# Patient Record
Sex: MALE | Race: WHITE | NOT HISPANIC OR LATINO | Employment: FULL TIME | URBAN - METROPOLITAN AREA
[De-identification: names, ages, dates, MRNs, and addresses within clinical notes are randomized per-mention and may not be internally consistent; named-entity substitution may affect disease eponyms.]

---

## 2020-11-03 ENCOUNTER — HOSPITAL ENCOUNTER (OUTPATIENT)
Dept: LAB | Facility: MEDICAL CENTER | Age: 35
End: 2020-11-03
Attending: PATHOLOGY
Payer: COMMERCIAL

## 2020-11-04 LAB — COVID ORDER STATUS COVID19: NORMAL

## 2020-11-05 LAB
SARS-COV-2 RNA RESP QL NAA+PROBE: NOTDETECTED
SPECIMEN SOURCE: NORMAL

## 2020-11-10 ENCOUNTER — HOSPITAL ENCOUNTER (OUTPATIENT)
Dept: LAB | Facility: MEDICAL CENTER | Age: 35
End: 2020-11-10
Payer: COMMERCIAL

## 2020-11-10 LAB — COVID ORDER STATUS COVID19: NORMAL

## 2020-11-11 LAB
SARS-COV-2 RNA RESP QL NAA+PROBE: NOTDETECTED
SPECIMEN SOURCE: NORMAL

## 2023-05-13 ENCOUNTER — OFFICE VISIT (OUTPATIENT)
Dept: URGENT CARE | Facility: PHYSICIAN GROUP | Age: 38
End: 2023-05-13
Payer: COMMERCIAL

## 2023-05-13 VITALS
DIASTOLIC BLOOD PRESSURE: 64 MMHG | SYSTOLIC BLOOD PRESSURE: 100 MMHG | HEIGHT: 70 IN | HEART RATE: 84 BPM | BODY MASS INDEX: 22.9 KG/M2 | RESPIRATION RATE: 12 BRPM | TEMPERATURE: 98.1 F | OXYGEN SATURATION: 98 % | WEIGHT: 160 LBS

## 2023-05-13 DIAGNOSIS — J02.0 STREP PHARYNGITIS: ICD-10-CM

## 2023-05-13 DIAGNOSIS — J02.9 SORE THROAT: ICD-10-CM

## 2023-05-13 LAB
INT CON NEG: NORMAL
INT CON POS: NORMAL
S PYO AG THROAT QL: POSITIVE

## 2023-05-13 PROCEDURE — 99203 OFFICE O/P NEW LOW 30 MIN: CPT | Performed by: PHYSICIAN ASSISTANT

## 2023-05-13 PROCEDURE — 87651 STREP A DNA AMP PROBE: CPT | Performed by: PHYSICIAN ASSISTANT

## 2023-05-13 RX ORDER — AMOXICILLIN 500 MG/1
500 CAPSULE ORAL 2 TIMES DAILY
Qty: 20 CAPSULE | Refills: 0 | Status: SHIPPED | OUTPATIENT
Start: 2023-05-13 | End: 2023-05-23

## 2023-05-13 ASSESSMENT — ENCOUNTER SYMPTOMS
VOMITING: 0
EYE REDNESS: 0
HEADACHES: 1
COUGH: 0
MYALGIAS: 1
EYE DISCHARGE: 0
DIARRHEA: 0
TROUBLE SWALLOWING: 1
FEVER: 0
NAUSEA: 0
SORE THROAT: 1
SHORTNESS OF BREATH: 0

## 2023-05-13 NOTE — PROGRESS NOTES
"Subjective     Keaton Bourne is a 38 y.o. male who presents with Illness (X3days sore throat, left ear pain and puss on throat )            Pharyngitis   This is a new problem. Episode onset: x 4-5 days ago. The problem has been unchanged. The pain is worse on the left side. There has been no fever. Associated symptoms include congestion, ear pain, headaches and trouble swallowing (The patient reports increased pain with swallowing.). Pertinent negatives include no coughing, diarrhea, drooling, shortness of breath or vomiting. He has had no exposure to strep. He has tried nothing for the symptoms.     The patient reports no recent sick contacts    PMH:  has no past medical history on file.  MEDS: No current outpatient medications on file.  ALLERGIES: No Known Allergies  SURGHX: No past surgical history on file.  SOCHX:  reports that he has never smoked. He does not have any smokeless tobacco history on file. He reports current alcohol use. He reports current drug use.  FH: Family history was reviewed, no pertinent findings to report    Review of Systems   Constitutional:  Negative for fever.   HENT:  Positive for congestion, ear pain, sore throat and trouble swallowing (The patient reports increased pain with swallowing.). Negative for drooling.    Eyes:  Negative for discharge and redness.   Respiratory:  Negative for cough and shortness of breath.    Cardiovascular:  Negative for chest pain.   Gastrointestinal:  Negative for diarrhea, nausea and vomiting.   Musculoskeletal:  Positive for myalgias.   Neurological:  Positive for headaches.              Objective     /64 (BP Location: Right arm, Patient Position: Sitting, BP Cuff Size: Adult)   Pulse 84   Temp 36.7 °C (98.1 °F) (Temporal)   Resp 12   Ht 1.778 m (5' 10\")   Wt 72.6 kg (160 lb)   SpO2 98%   BMI 22.96 kg/m²      Physical Exam  Constitutional:       General: He is not in acute distress.     Appearance: Normal appearance. He is not " ill-appearing.   HENT:      Head: Normocephalic and atraumatic.      Right Ear: External ear normal.      Left Ear: External ear normal.      Nose: Nose normal.      Mouth/Throat:      Mouth: Mucous membranes are moist.      Pharynx: Oropharynx is clear. Uvula midline. Posterior oropharyngeal erythema present.      Tonsils: Tonsillar exudate present. 1+ on the left.   Eyes:      Extraocular Movements: Extraocular movements intact.      Conjunctiva/sclera: Conjunctivae normal.   Cardiovascular:      Rate and Rhythm: Normal rate and regular rhythm.      Heart sounds: Normal heart sounds.   Pulmonary:      Effort: Pulmonary effort is normal. No respiratory distress.      Breath sounds: Normal breath sounds. No wheezing.   Musculoskeletal:         General: Normal range of motion.      Cervical back: Normal range of motion and neck supple.   Skin:     General: Skin is warm and dry.   Neurological:      Mental Status: He is alert and oriented to person, place, and time.               Progress:  POCT Rapid Strep: POSITIVE               Assessment & Plan          1. Sore throat  - POCT Rapid Strep A    2. Strep pharyngitis  - amoxicillin (AMOXIL) 500 MG Cap; Take 1 Capsule by mouth 2 times a day for 10 days.  Dispense: 20 Capsule; Refill: 0    Differential diagnoses, supportive care, and indications for immediate follow-up discussed with patient.   Instructed to return to clinic or nearest emergency department for any change in condition, further concerns, or worsening of symptoms.    OTC Tylenol or Motrin for fever/discomfort.  OTC Supportive Care for Sore Throat - warm salt water gargles, sore throat lozenges, warm lemon water, and/or tea.  Drink plenty of fluids  Follow-up with PCP   Return to clinic or go to the ED if symptoms worsen or fail to improve, or if patient should develop worsening/increasing sore throat, difficulty swallowing, drooling, change in voice, swollen glands, shortness of breath, ear pain, cough,  congestion, fever/chills, and/or any concerning symptoms.    Discussed plan with the patient, and he agrees to the above.    I personally reviewed prior external notes and test results pertinent to today's visit.  I have independently reviewed and interpreted all diagnostics ordered during this urgent care visit.     Please note that this dictation was created using voice recognition software. I have made every reasonable attempt to correct obvious errors, but I expect that there may be errors of grammar and possibly content that I did not discover before finalizing the note.     This note was electronically signed by Adelita Lim PA-C

## 2024-07-25 ENCOUNTER — HOSPITAL ENCOUNTER (EMERGENCY)
Facility: MEDICAL CENTER | Age: 39
End: 2024-07-25
Attending: EMERGENCY MEDICINE
Payer: COMMERCIAL

## 2024-07-25 ENCOUNTER — APPOINTMENT (OUTPATIENT)
Dept: URGENT CARE | Facility: PHYSICIAN GROUP | Age: 39
End: 2024-07-25
Payer: COMMERCIAL

## 2024-07-25 VITALS
HEART RATE: 68 BPM | OXYGEN SATURATION: 97 % | TEMPERATURE: 97.9 F | RESPIRATION RATE: 18 BRPM | SYSTOLIC BLOOD PRESSURE: 122 MMHG | DIASTOLIC BLOOD PRESSURE: 74 MMHG

## 2024-07-25 DIAGNOSIS — Z77.098 CHEMICAL EXPOSURE OF EYE: ICD-10-CM

## 2024-07-25 PROCEDURE — 304217 HCHG IRRIGATION SYSTEM: Mod: EDC

## 2024-07-25 PROCEDURE — 700101 HCHG RX REV CODE 250: Performed by: EMERGENCY MEDICINE

## 2024-07-25 PROCEDURE — 99283 EMERGENCY DEPT VISIT LOW MDM: CPT | Mod: EDC

## 2024-07-25 PROCEDURE — 700105 HCHG RX REV CODE 258: Performed by: EMERGENCY MEDICINE

## 2024-07-25 RX ORDER — SODIUM CHLORIDE 9 MG/ML
1000 INJECTION, SOLUTION INTRAVENOUS ONCE
Status: COMPLETED | OUTPATIENT
Start: 2024-07-25 | End: 2024-07-25

## 2024-07-25 RX ORDER — PROPARACAINE HYDROCHLORIDE 5 MG/ML
2 SOLUTION/ DROPS OPHTHALMIC ONCE
Status: COMPLETED | OUTPATIENT
Start: 2024-07-25 | End: 2024-07-25

## 2024-07-25 RX ADMIN — PROPARACAINE HYDROCHLORIDE 2 DROP: 5 SOLUTION/ DROPS OPHTHALMIC at 09:49

## 2024-07-25 RX ADMIN — SODIUM CHLORIDE 1000 ML: 9 INJECTION, SOLUTION INTRAVENOUS at 09:30

## 2024-07-25 RX ADMIN — FLUORESCEIN SODIUM 1 MG: 1 STRIP OPHTHALMIC at 09:49

## 2024-07-25 ASSESSMENT — PAIN SCALES - WONG BAKER: WONGBAKER_NUMERICALRESPONSE: DOESN'T HURT AT ALL

## 2024-07-29 ENCOUNTER — OCCUPATIONAL MEDICINE (OUTPATIENT)
Dept: OCCUPATIONAL MEDICINE | Facility: CLINIC | Age: 39
End: 2024-07-29
Payer: COMMERCIAL

## 2024-07-29 VITALS
TEMPERATURE: 98.4 F | WEIGHT: 172 LBS | DIASTOLIC BLOOD PRESSURE: 84 MMHG | SYSTOLIC BLOOD PRESSURE: 130 MMHG | HEIGHT: 71 IN | RESPIRATION RATE: 16 BRPM | HEART RATE: 89 BPM | OXYGEN SATURATION: 97 % | BODY MASS INDEX: 24.08 KG/M2

## 2024-07-29 DIAGNOSIS — Z77.098 CHEMICAL EXPOSURE OF EYE: ICD-10-CM

## 2024-07-29 DIAGNOSIS — H53.9 VISUAL CHANGES: ICD-10-CM

## 2024-08-06 ENCOUNTER — OCCUPATIONAL MEDICINE (OUTPATIENT)
Dept: OCCUPATIONAL MEDICINE | Facility: CLINIC | Age: 39
End: 2024-08-06
Payer: COMMERCIAL

## 2024-08-06 VITALS
SYSTOLIC BLOOD PRESSURE: 108 MMHG | HEIGHT: 71 IN | DIASTOLIC BLOOD PRESSURE: 72 MMHG | HEART RATE: 90 BPM | RESPIRATION RATE: 16 BRPM | BODY MASS INDEX: 24.92 KG/M2 | TEMPERATURE: 97.8 F | OXYGEN SATURATION: 97 % | WEIGHT: 178 LBS

## 2024-08-06 DIAGNOSIS — Z77.098 CHEMICAL EXPOSURE OF EYE: ICD-10-CM

## 2024-08-06 DIAGNOSIS — H53.9 VISUAL CHANGES: ICD-10-CM

## 2024-08-06 PROCEDURE — 3074F SYST BP LT 130 MM HG: CPT | Performed by: NURSE PRACTITIONER

## 2024-08-06 PROCEDURE — 99213 OFFICE O/P EST LOW 20 MIN: CPT | Performed by: NURSE PRACTITIONER

## 2024-08-06 PROCEDURE — 3078F DIAST BP <80 MM HG: CPT | Performed by: NURSE PRACTITIONER

## 2024-08-06 ASSESSMENT — ENCOUNTER SYMPTOMS
EYE PAIN: 0
EYE REDNESS: 1
PHOTOPHOBIA: 0
EYE DISCHARGE: 0
CARDIOVASCULAR NEGATIVE: 1
BLURRED VISION: 0
SENSORY CHANGE: 0
CONSTITUTIONAL NEGATIVE: 1
DOUBLE VISION: 0
PSYCHIATRIC NEGATIVE: 1

## 2024-08-06 NOTE — LETTER
PHYSICIAN’S AND CHIROPRACTIC PHYSICIAN'S   PROGRESS REPORT CERTIFICATION OF DISABILITY Claim Number:     Social Security Number:    Patient’s Name:Keaton Bourne Date of Injury:7/24/2024   Employer: [employer] Name of O (if applicable)      Patient’s Job Description/Occupation: [employer]       Previous Injuries/Diseases/Surgeries Contributing to the Condition:  Copied from ED visit ) DOI 6/24/2024.  RANDALL: Keaton Bourne is a 39 y.o. male who presents to the ED complaining of right eye pain onset 8AM. The patient explains that chemical exposure to his eye occurred at work. He was moving a new delivery of detergent boxes. As he was lifting one of the boxes, the box flap was unknowingly open, which then chemicals made contact with his right eye. The chemicals were a chlorine agent with a pH of 11.5-12.5. Patient reports immediate burn after contact. He immediately rinised out his eye at the work eyewash station for 5-10 minutes.  He has not been anesthesized. He removed his corrective lens. Patient reports 5-6/10 pain.     Today patient states symptoms have improved.  He has been seen by the optometrist 2 times since his last visit.  He was given eyedrops which seem to have helped his symptoms significantly with the addition of eye rest.  Since then he has had no further issues with crusting, pain, no blurriness, or running in his eye.  He has continued to use his eyeglasses with minimal difficulty.  He has a follow-up appointment on Monday with the optometrist and anticipate discharge/MMI at this time unless further recommendations are indicated.  He was placed on restrictions and return to full duty on Monday with minimal difficulty.   Plan of care discussed with patient.  Last eye exam was 10 days prior.              Diagnosis:  (Z77.098) Chemical exposure of eye  (H53.9) Visual changes      Related to the Industrial Injury? Yes     Explain:       Objective Medical Findings:Right eye: No  gross deformity noted.  The sclera is mildly injected to the inner canthus.  Otherwise EOMI.  PERRLA.  No discharge noted.        None - Discharged                         Stable  Yes                 Ratable  No     X  Generally Improved                        Condition Worsened                 Condition Same  May Have Suffered a Permanent Disability  No     Treatment Plan:    Discharge/care transfer to optometry  Released to full duty  Okay to discharge/MMI if no further indications or recommendations per optometry  Continue with eyedrops as prescribed        No Change in Therapy                 PT/OT Prescribed                   X  Medication May be Used While Working       Case Management                        PT/OT Discontinued  X  Consultation    Further Diagnostic Studies:                               Prescription(s)                   Eyedrops per optometry       X  Released to FULL DUTY /No Restrictions on (Date):  From:      Certified TOTALLY TEMPORARILY DISABLED (Indicate Dates) From:   To:      Released to RESTRICTED/Modified Duty on (Date): From:   To:                                                                 Restrictions Are:  Temporary   X  No Sitting                             X  No Standing                 X  No Pulling                  Other:    X  No Bending at Waist         X  No Stooping                  X  No Lifting     X  No Carrying                         X  No Walking                Lifting Restricted to (lbs.):     X  No Pushing                          X  No Climbing                 X  No Reaching Above Shoulders   Date of Next Visit:    Date of this Exam:8/6/2024 Physician/Chiropractic Physician Name:FRENCH Harvey Physician/Chiropractic Physician Signature:  Ryan Ca DO MPH   D-39 (Rev. 2/24)

## 2024-08-06 NOTE — PROGRESS NOTES
Subjective:     Keaton Bourne is a 39 y.o. male who presents for Follow-Up (DOI: 7/24/24 right eye)    Copied from ED visit ) DOI 6/24/2024.  RANDALL: Keaton Bourne is a 39 y.o. male who presents to the ED complaining of right eye pain onset 8AM. The patient explains that chemical exposure to his eye occurred at work. He was moving a new delivery of detergent boxes. As he was lifting one of the boxes, the box flap was unknowingly open, which then chemicals made contact with his right eye. The chemicals were a chlorine agent with a pH of 11.5-12.5. Patient reports immediate burn after contact. He immediately rinised out his eye at the work eyewash station for 5-10 minutes.  He has not been anesthesized. He removed his corrective lens. Patient reports 5-6/10 pain.     Today patient states symptoms have improved.  He has been seen by the optometrist 2 times since his last visit.  He was given eyedrops which seem to have helped his symptoms significantly with the addition of eye rest.  Since then he has had no further issues with crusting, pain, no blurriness, or running in his eye.  He has continued to use his eyeglasses with minimal difficulty.  He has a follow-up appointment on Monday with the optometrist and anticipate discharge/MMI at this time unless further recommendations are indicated.  He was placed on restrictions and return to full duty on Monday with minimal difficulty.   Plan of care discussed with patient.  Last eye exam was 10 days prior.    Review of Systems   Constitutional: Negative.    Eyes:  Positive for redness. Negative for blurred vision, double vision, photophobia, pain and discharge.   Cardiovascular: Negative.    Neurological:  Negative for sensory change.   Psychiatric/Behavioral: Negative.         SOCHX: Works as a  at [employer]   FH: No pertinent family history to this problem.       Objective:     /72 (BP Location: Right arm, Patient Position:  "Sitting, BP Cuff Size: Adult)   Pulse 90   Temp 36.6 °C (97.8 °F) (Temporal)   Resp 16   Ht 1.803 m (5' 11\")   Wt 80.7 kg (178 lb)   SpO2 97%   BMI 24.83 kg/m²     Constitutional: Patient is in no acute distress. Appears well-developed and well-nourished.   Cardiovascular: Normal rate.    Pulmonary/Chest: Effort normal. No respiratory distress.   Neurological: Patient is alert and oriented to person, place, and time.   Skin: Skin is warm and dry.   Psychiatric: Normal mood and affect. Behavior is normal.     Right eye: No gross deformity noted.  The sclera is mildly injected to the inner canthus.  Otherwise EOMI.  PERRLA.  No discharge noted.    Assessment/Plan:       1. Chemical exposure of eye    2. Visual changes    Discharge/care transfer to optometry  Released to full duty  Okay to discharge/MMI if no further indications or recommendations per optometry  Continue with eyedrops as prescribed          Work Status: Full Duty - see D-39 or other state/federal worker's compensation forms for specific restrictions if applicable  Follow up 1 week with Optometry   Follow-up with optometry as scheduled  Discharge/MMI if no further treatment indicated per Optometry      Differential diagnosis, natural history, supportive care, and indications for immediate follow-up discussed.     Approximately 30 minutes were spent in reviewing notes, preparing for visit, obtaining history, exam and evaluation, patient counseling/education, and post visit documentation/orders. Significant time was spent completing state/federal worker's compensation forms.    "

## 2024-08-06 NOTE — LETTER
PHYSICIAN’S AND CHIROPRACTIC PHYSICIAN'S   PROGRESS REPORT CERTIFICATION OF DISABILITY Claim Number:     Social Security Number:    Patient’s Name:Keaton Bourne Date of Injury:7/24/2024   Employer: [employer] Name of O (if applicable)      Patient’s Job Description/Occupation: [employer]       Previous Injuries/Diseases/Surgeries Contributing to the Condition:  Copied from ED visit ) DOI 6/24/2024.  RANDALL: Keaton Bourne is a 39 y.o. male who presents to the ED complaining of right eye pain onset 8AM. The patient explains that chemical exposure to his eye occurred at work. He was moving a new delivery of detergent boxes. As he was lifting one of the boxes, the box flap was unknowingly open, which then chemicals made contact with his right eye. The chemicals were a chlorine agent with a pH of 11.5-12.5. Patient reports immediate burn after contact. He immediately rinised out his eye at the work eyewash station for 5-10 minutes.  He has not been anesthesized. He removed his corrective lens. Patient reports 5-6/10 pain.     Today patient states symptoms have improved.  He has been seen by the optometrist 2 times since his last visit.  He was given eyedrops which seem to have helped his symptoms significantly with the addition of eye rest.  Since then he has had no further issues with crusting, pain, no blurriness, or running in his eye.  He has continued to use his eyeglasses with minimal difficulty.  He has a follow-up appointment on Monday with the optometrist and anticipate discharge/MMI at this time unless further recommendations are indicated.  He was placed on restrictions and return to full duty on Monday with minimal difficulty.   Plan of care discussed with patient.  Last eye exam was 10 days prior.              Diagnosis:  (Z77.098) Chemical exposure of eye  (H53.9) Visual changes      Related to the Industrial Injury? Yes     Explain:       Objective Medical Findings:Right eye: No  gross deformity noted.  The sclera is mildly injected to the inner canthus.  Otherwise EOMI.  PERRLA.  No discharge noted.        None - Discharged                         Stable  Yes                 Ratable  No     X  Generally Improved                        Condition Worsened                 Condition Same  May Have Suffered a Permanent Disability  No     Treatment Plan:    Discharge/care transfer to optometry  Released to full duty  Okay to discharge/MMI if no further indications or recommendations per optometry  Continue with eyedrops as prescribed        No Change in Therapy                 PT/OT Prescribed                   X  Medication May be Used While Working       Case Management                        PT/OT Discontinued  X  Consultation    Further Diagnostic Studies:                               Prescription(s)                   Eyedrops per optometry       X  Released to FULL DUTY /No Restrictions on (Date):  From: 8/6/2024    Certified TOTALLY TEMPORARILY DISABLED (Indicate Dates) From:   To:      Released to RESTRICTED/Modified Duty on (Date): From:   To:                                                                 Restrictions Are:  Temporary     No Sitting                               No Standing                   No Pulling                  Other:      No Bending at Waist           No Stooping                    No Lifting       No Carrying                           No Walking                Lifting Restricted to (lbs.):       No Pushing                            No Climbing                   No Reaching Above Shoulders   Date of Next Visit:    Date of this Exam:8/6/2024 Physician/Chiropractic Physician Name:RFENCH Harvey Physician/Chiropractic Physician Signature:  Ryan Ca DO MPH   D-39 (Rev. 2/24)

## 2025-03-31 ENCOUNTER — APPOINTMENT (OUTPATIENT)
Dept: RADIOLOGY | Facility: MEDICAL CENTER | Age: 40
End: 2025-03-31
Attending: EMERGENCY MEDICINE
Payer: COMMERCIAL

## 2025-03-31 ENCOUNTER — OFFICE VISIT (OUTPATIENT)
Dept: URGENT CARE | Facility: PHYSICIAN GROUP | Age: 40
End: 2025-03-31
Payer: COMMERCIAL

## 2025-03-31 ENCOUNTER — HOSPITAL ENCOUNTER (EMERGENCY)
Facility: MEDICAL CENTER | Age: 40
End: 2025-03-31
Attending: EMERGENCY MEDICINE
Payer: COMMERCIAL

## 2025-03-31 VITALS
RESPIRATION RATE: 15 BRPM | HEART RATE: 76 BPM | WEIGHT: 172.4 LBS | BODY MASS INDEX: 24.14 KG/M2 | DIASTOLIC BLOOD PRESSURE: 83 MMHG | TEMPERATURE: 97.9 F | OXYGEN SATURATION: 94 % | HEIGHT: 71 IN | SYSTOLIC BLOOD PRESSURE: 124 MMHG

## 2025-03-31 VITALS
HEART RATE: 74 BPM | WEIGHT: 170 LBS | HEIGHT: 71 IN | BODY MASS INDEX: 23.8 KG/M2 | TEMPERATURE: 97.9 F | RESPIRATION RATE: 16 BRPM | OXYGEN SATURATION: 97 % | SYSTOLIC BLOOD PRESSURE: 118 MMHG | DIASTOLIC BLOOD PRESSURE: 78 MMHG

## 2025-03-31 DIAGNOSIS — N50.812 TESTICULAR PAIN, LEFT: ICD-10-CM

## 2025-03-31 DIAGNOSIS — N45.1 EPIDIDYMITIS: ICD-10-CM

## 2025-03-31 LAB
ANION GAP SERPL CALC-SCNC: 16 MMOL/L (ref 7–16)
APPEARANCE UR: CLEAR
BASOPHILS # BLD AUTO: 0.3 % (ref 0–1.8)
BASOPHILS # BLD: 0.03 K/UL (ref 0–0.12)
BILIRUB UR QL STRIP.AUTO: NEGATIVE
BUN SERPL-MCNC: 12 MG/DL (ref 8–22)
CALCIUM SERPL-MCNC: 10.1 MG/DL (ref 8.5–10.5)
CHLORIDE SERPL-SCNC: 98 MMOL/L (ref 96–112)
CO2 SERPL-SCNC: 24 MMOL/L (ref 20–33)
COLOR UR: YELLOW
CREAT SERPL-MCNC: 0.87 MG/DL (ref 0.5–1.4)
EOSINOPHIL # BLD AUTO: 0.07 K/UL (ref 0–0.51)
EOSINOPHIL NFR BLD: 0.8 % (ref 0–6.9)
ERYTHROCYTE [DISTWIDTH] IN BLOOD BY AUTOMATED COUNT: 38.9 FL (ref 35.9–50)
GFR SERPLBLD CREATININE-BSD FMLA CKD-EPI: 112 ML/MIN/1.73 M 2
GLUCOSE SERPL-MCNC: 92 MG/DL (ref 65–99)
GLUCOSE UR STRIP.AUTO-MCNC: NEGATIVE MG/DL
HCT VFR BLD AUTO: 50.7 % (ref 42–52)
HGB BLD-MCNC: 17.8 G/DL (ref 14–18)
IMM GRANULOCYTES # BLD AUTO: 0.05 K/UL (ref 0–0.11)
IMM GRANULOCYTES NFR BLD AUTO: 0.5 % (ref 0–0.9)
KETONES UR STRIP.AUTO-MCNC: NEGATIVE MG/DL
LEUKOCYTE ESTERASE UR QL STRIP.AUTO: NEGATIVE
LYMPHOCYTES # BLD AUTO: 1.67 K/UL (ref 1–4.8)
LYMPHOCYTES NFR BLD: 18.3 % (ref 22–41)
MCH RBC QN AUTO: 30.5 PG (ref 27–33)
MCHC RBC AUTO-ENTMCNC: 35.1 G/DL (ref 32.3–36.5)
MCV RBC AUTO: 87 FL (ref 81.4–97.8)
MICRO URNS: NORMAL
MONOCYTES # BLD AUTO: 0.57 K/UL (ref 0–0.85)
MONOCYTES NFR BLD AUTO: 6.3 % (ref 0–13.4)
NEUTROPHILS # BLD AUTO: 6.72 K/UL (ref 1.82–7.42)
NEUTROPHILS NFR BLD: 73.8 % (ref 44–72)
NITRITE UR QL STRIP.AUTO: NEGATIVE
NRBC # BLD AUTO: 0 K/UL
NRBC BLD-RTO: 0 /100 WBC (ref 0–0.2)
PH UR STRIP.AUTO: 5.5 [PH] (ref 5–8)
PLATELET # BLD AUTO: 436 K/UL (ref 164–446)
PMV BLD AUTO: 8.6 FL (ref 9–12.9)
POTASSIUM SERPL-SCNC: 3.7 MMOL/L (ref 3.6–5.5)
PROT UR QL STRIP: NEGATIVE MG/DL
RBC # BLD AUTO: 5.83 M/UL (ref 4.7–6.1)
RBC UR QL AUTO: NEGATIVE
SODIUM SERPL-SCNC: 138 MMOL/L (ref 135–145)
SP GR UR STRIP.AUTO: 1.01
UROBILINOGEN UR STRIP.AUTO-MCNC: 1 EU/DL
WBC # BLD AUTO: 9.1 K/UL (ref 4.8–10.8)

## 2025-03-31 PROCEDURE — 85025 COMPLETE CBC W/AUTO DIFF WBC: CPT

## 2025-03-31 PROCEDURE — 76870 US EXAM SCROTUM: CPT

## 2025-03-31 PROCEDURE — 700111 HCHG RX REV CODE 636 W/ 250 OVERRIDE (IP): Mod: JZ | Performed by: EMERGENCY MEDICINE

## 2025-03-31 PROCEDURE — 700101 HCHG RX REV CODE 250

## 2025-03-31 PROCEDURE — 96374 THER/PROPH/DIAG INJ IV PUSH: CPT

## 2025-03-31 PROCEDURE — 96372 THER/PROPH/DIAG INJ SC/IM: CPT

## 2025-03-31 PROCEDURE — 87491 CHLMYD TRACH DNA AMP PROBE: CPT

## 2025-03-31 PROCEDURE — 81003 URINALYSIS AUTO W/O SCOPE: CPT

## 2025-03-31 PROCEDURE — 87591 N.GONORRHOEAE DNA AMP PROB: CPT

## 2025-03-31 PROCEDURE — 80048 BASIC METABOLIC PNL TOTAL CA: CPT

## 2025-03-31 PROCEDURE — 36415 COLL VENOUS BLD VENIPUNCTURE: CPT

## 2025-03-31 PROCEDURE — 99284 EMERGENCY DEPT VISIT MOD MDM: CPT

## 2025-03-31 RX ORDER — CEFTRIAXONE 500 MG/1
500 INJECTION, POWDER, FOR SOLUTION INTRAMUSCULAR; INTRAVENOUS ONCE
Status: COMPLETED | OUTPATIENT
Start: 2025-03-31 | End: 2025-03-31

## 2025-03-31 RX ORDER — DOXYCYCLINE 100 MG/1
100 CAPSULE ORAL 2 TIMES DAILY
Qty: 20 CAPSULE | Refills: 0 | Status: ACTIVE | OUTPATIENT
Start: 2025-03-31 | End: 2025-04-10

## 2025-03-31 RX ADMIN — FENTANYL CITRATE 50 MCG: 50 INJECTION, SOLUTION INTRAMUSCULAR; INTRAVENOUS at 11:11

## 2025-03-31 RX ADMIN — CEFTRIAXONE SODIUM 500 MG: 500 INJECTION, POWDER, FOR SOLUTION INTRAMUSCULAR; INTRAVENOUS at 13:20

## 2025-03-31 RX ADMIN — LIDOCAINE HYDROCHLORIDE 1 ML: 10 INJECTION, SOLUTION EPIDURAL; INFILTRATION; INTRACAUDAL; PERINEURAL at 13:20

## 2025-03-31 NOTE — ED PROVIDER NOTES
"ED Provider Note    CHIEF COMPLAINT  Chief Complaint   Patient presents with    Sent from Urgent Care    Testicle Pain     Left       EXTERNAL RECORDS REVIEWED  Outpatient Notes urgent care note from earlier today reviewed    HPI/ROS  LIMITATION TO HISTORY   Select: : None  OUTSIDE HISTORIAN(S):  None    Keaton Bourne is a 39 y.o. male who presents to the emergency department with left-sided testicular pain.  Childhood history of testicular torsion.  Cannot remember if he underwent additional corrective procedures at that time.  Additionally he has had prior procedural history of vasectomy.  He has had some recent left-sided testicular discomfort which he thought may be due to some repetitive movements at work.  However today this pain became significantly worse ultimately bring him to urgent care and then transferred to this facility for higher level of workup.  Pain is moderate.  Pooping and peeing well.  No abdominal pain.    PAST MEDICAL HISTORY       SURGICAL HISTORY  patient denies any surgical history    FAMILY HISTORY  No family history on file.    SOCIAL HISTORY  Social History     Tobacco Use    Smoking status: Never     Passive exposure: Never    Smokeless tobacco: Not on file   Vaping Use    Vaping status: Never Used   Substance and Sexual Activity    Alcohol use: Yes     Comment: socially    Drug use: Not Currently     Comment: marijuana     Sexual activity: Not on file       CURRENT MEDICATIONS  Home Medications       Reviewed by Norma Arboleda R.N. (Registered Nurse) on 03/31/25 at 1041  Med List Status: Complete     Medication Last Dose Status        Patient Prosper Taking any Medications                         Audit from Redirected Encounters    **Home medications have not yet been reviewed for this encounter**         ALLERGIES  No Known Allergies    PHYSICAL EXAM  VITAL SIGNS: /83   Pulse 76   Temp 36.6 °C (97.9 °F) (Temporal)   Resp 15   Ht 1.803 m (5' 11\")   Wt 78.2 " kg (172 lb 6.4 oz)   SpO2 94%   BMI 24.04 kg/m²      Pulse ox interpretation: I interpret this pulse ox as normal.  Constitutional: Alert in no apparent distress.  HENT: No signs of trauma, Bilateral external ears normal, Nose normal.   Eyes: Pupils are equal and reactive  Neck: Normal range of motion, No tenderness, Supple  Cardiovascular: Regular rate and rhythm, no murmurs.   Thorax & Lungs: Normal breath sounds, No respiratory distress  Skin: Warm, Dry, No erythema, No rash.   Musculoskeletal: Good range of motion in all major joints. No tenderness to palpation or major deformities noted.   Neurologic: Alert , Normal motor function, Normal sensory function, No focal deficits noted.   Psychiatric: Affect normal, Judgment normal, Mood normal.         EKG/LABS  Results for orders placed or performed during the hospital encounter of 03/31/25   CBC WITH DIFFERENTIAL    Collection Time: 03/31/25 11:07 AM   Result Value Ref Range    WBC 9.1 4.8 - 10.8 K/uL    RBC 5.83 4.70 - 6.10 M/uL    Hemoglobin 17.8 14.0 - 18.0 g/dL    Hematocrit 50.7 42.0 - 52.0 %    MCV 87.0 81.4 - 97.8 fL    MCH 30.5 27.0 - 33.0 pg    MCHC 35.1 32.3 - 36.5 g/dL    RDW 38.9 35.9 - 50.0 fL    Platelet Count 436 164 - 446 K/uL    MPV 8.6 (L) 9.0 - 12.9 fL    Neutrophils-Polys 73.80 (H) 44.00 - 72.00 %    Lymphocytes 18.30 (L) 22.00 - 41.00 %    Monocytes 6.30 0.00 - 13.40 %    Eosinophils 0.80 0.00 - 6.90 %    Basophils 0.30 0.00 - 1.80 %    Immature Granulocytes 0.50 0.00 - 0.90 %    Nucleated RBC 0.00 0.00 - 0.20 /100 WBC    Neutrophils (Absolute) 6.72 1.82 - 7.42 K/uL    Lymphs (Absolute) 1.67 1.00 - 4.80 K/uL    Monos (Absolute) 0.57 0.00 - 0.85 K/uL    Eos (Absolute) 0.07 0.00 - 0.51 K/uL    Baso (Absolute) 0.03 0.00 - 0.12 K/uL    Immature Granulocytes (abs) 0.05 0.00 - 0.11 K/uL    NRBC (Absolute) 0.00 K/uL   URINALYSIS    Collection Time: 03/31/25 11:07 AM    Specimen: Urine, Clean Catch   Result Value Ref Range    Color Yellow      Character Clear     Specific Gravity 1.014 <1.035    Ph 5.5 5.0 - 8.0    Glucose Negative Negative mg/dL    Ketones Negative Negative mg/dL    Protein Negative Negative mg/dL    Bilirubin Negative Negative    Urobilinogen, Urine 1.0 <=1.0 EU/dL    Nitrite Negative Negative    Leukocyte Esterase Negative Negative    Occult Blood Negative Negative    Micro Urine Req see below    Chlamydia/GC, PCR (Urine)    Collection Time: 03/31/25 11:07 AM    Specimen: Urine   Result Value Ref Range    Source Urine            RADIOLOGY/PROCEDURES   I have independently interpreted the diagnostic imaging associated with this visit and am waiting the final reading from the radiologist.   My preliminary interpretation is as follows: No obvious evidence of devascularization/torsion    Radiologist interpretation:  [id number]   Final Result      Mild left epididymal hyperemia which could indicate epididymitis          COURSE & MEDICAL DECISION MAKING    ASSESSMENT, COURSE AND PLAN  Care Narrative: Patient presenting with unilateral scrotal pain.  Will complete labs, UA and ultrasound imaging    DISPOSITION AND DISCUSSIONS  I have discussed management of the patient with the following physicians and EDEN's: None    Discussion of management with other QHP or appropriate source(s): Pharmacist for medication verification    Escalation of care considered, and ultimately not performed:acute inpatient care management, however at this time, the patient is most appropriate for outpatient management    Barriers to care at this time, including but not limited to: Patient does not have established PCP.     Decision tools and prescription drugs considered including, but not limited to: empirically prescribed for epididymitisAntibiotics   .    39-year-old presented to the ER with left-sided testicular discomfort.  Workup as above from a hematology and urinalysis standpoint are relatively benign.  Ultrasound does show evidence of left  epididymitis.  Patient will be empirically treated for age to include ceftriaxone and doxycycline.  Patient will be referred to local PCP as he does not currently have 1.  Understanding of ongoing home care especially for comfort.  We can avoid narcotic analgesia at this time.  Patient will return to the ER with any change or worsening or new symptoms.    STI panel remains pending and hospital will contact them as per protocol but patient also understanding that he can findings results on [employer]    FINAL DIAGNOSIS  1. Epididymitis         Electronically signed by: Greg Lieberman M.D., 3/31/2025 11:05 AM

## 2025-03-31 NOTE — ED TRIAGE NOTES
Vitals:    03/31/25 1034   BP: 124/83   Pulse: 73   Resp: 16   Temp: 36.6 °C (97.9 °F)   SpO2: 97%     Chief Complaint   Patient presents with    Sent from Urgent Care    Testicle Pain     Left     Pt reports several days of left sided groin pain he describes as a deep ache. This morning he woke and the pain is now quite sharp and severe. He was initially seen at urgent care and instructed to come to the ED. Pt reports no dysuria, he has no concerns for STDs. He has had a torsion in the past when he was younger and this feels very similar.     Pt is ambulatory to and from triage and is alert and oriented x 4.

## 2025-03-31 NOTE — PROGRESS NOTES
"Chief Complaint   Patient presents with    Testicle Pain     Hard to walk, X2wk         Subjective:   HISTORY OF PRESENT ILLNESS: Keaton Bourne is a 39 y.o. male who presents for severe left sided testicular pain.  He reports having a bit of a dull ache for 2 weeks but this morning is suddenly got much worse to the point he is having trouble walking and feels nauseated.  He has a hx of partial torsion of this testicle when he was 14.  He feels this pain is reminiscent of that event.    Patient denies painful urination, penile discharge or concerns for STD.  There is no swelling or redness to the testicle.      Medications, Allergies, current problem list, Social and Family history reviewed today in Epic.     Objective:     /78 (BP Location: Left arm, Patient Position: Sitting, BP Cuff Size: Adult)   Pulse 74   Temp 36.6 °C (97.9 °F) (Temporal)   Resp 16   Ht 1.803 m (5' 11\")   Wt 77.1 kg (170 lb)   SpO2 97%     Physical Exam  Vitals reviewed.   Constitutional:       Appearance: Normal appearance.   HENT:      Mouth/Throat:      Mouth: Mucous membranes are moist.   Cardiovascular:      Rate and Rhythm: Normal rate.   Pulmonary:      Effort: Pulmonary effort is normal.   Abdominal:      Hernia: There is no hernia in the left inguinal area.   Genitourinary:     Penis: No discharge.       Testes:         Left: Tenderness present. Mass, swelling, testicular hydrocele or varicocele not present. Left testis is descended. Cremasteric reflex is present.       Epididymis:      Left: Not inflamed or enlarged. No mass or tenderness.      Comments: Tenderness in the anterior superior aspect of the left testicle     Normal exam  Lymphadenopathy:      Lower Body: No left inguinal adenopathy.   Skin:     General: Skin is warm and dry.   Neurological:      Mental Status: He is alert and oriented to person, place, and time.   Psychiatric:         Mood and Affect: Mood normal.          Assessment/Plan: "     Diagnosis and associated orders    I personally reviewed prior external notes and test results pertinent to today's visit.     1. Testicular pain, left  CANCELED: POCT Urinalysis    CANCELED: Chlamydia/GC, PCR (Urine)    CANCELED: [id number]            IMPRESSION: pt present with 2 weeks of dull ache to the left testicle who had a sudden increase of severe pain this morning on waking.  Hx of what sounds like a partial torsion that did not require surgical intervention to this testicle in his teens. Concerned for possible torsion.  Low risk for STD involvement.  Pt unable to provide urine sample in clinic, no urinary complains.  Describes difficulty with erections for about 2 weeks. He is quite uncomfortable and having trouble just sitting and walking.   He is referred to the ER for further evaluation.        Please note that this dictation was created using voice recognition software. I have made a reasonable attempt to correct obvious errors, but I expect that there are errors of grammar and possibly content that I did not discover before finalizing the note.    This note was electronically signed by FRENCH Price

## 2025-04-01 LAB
C TRACH DNA SPEC QL NAA+PROBE: NEGATIVE
N GONORRHOEA DNA SPEC QL NAA+PROBE: NEGATIVE
SPECIMEN SOURCE: NORMAL